# Patient Record
Sex: FEMALE | Race: WHITE | NOT HISPANIC OR LATINO | URBAN - METROPOLITAN AREA
[De-identification: names, ages, dates, MRNs, and addresses within clinical notes are randomized per-mention and may not be internally consistent; named-entity substitution may affect disease eponyms.]

---

## 2018-10-04 ENCOUNTER — OFFICE VISIT (OUTPATIENT)
Dept: OBSTETRICS AND GYNECOLOGY | Facility: CLINIC | Age: 28
End: 2018-10-04
Payer: COMMERCIAL

## 2018-10-04 VITALS
DIASTOLIC BLOOD PRESSURE: 70 MMHG | WEIGHT: 174.4 LBS | SYSTOLIC BLOOD PRESSURE: 110 MMHG | BODY MASS INDEX: 26.43 KG/M2 | HEIGHT: 68 IN

## 2018-10-04 DIAGNOSIS — Z01.419 WELL WOMAN EXAM WITH ROUTINE GYNECOLOGICAL EXAM: Primary | ICD-10-CM

## 2018-10-04 PROCEDURE — 99385 PREV VISIT NEW AGE 18-39: CPT | Performed by: OBSTETRICS & GYNECOLOGY

## 2018-10-04 NOTE — PROGRESS NOTES
10/4/2018  Kenyatta Carey is a 28 y.o. female who presents for annual exam.    Interested in getting IUD - thinking Merle.    Just went through breakup.        OB History:   Obstetric History       T0      L0     SAB0   TAB0   Ectopic0   Multiple0   Live Births0           Gyn History:    Patient's last menstrual period was 2018 (exact date).  Menses monthly- just went off the pill a month ago.  Switched brands throughout the year- mood swings, weight gain 10lb.    No hx of fibroids/ cysts  No hx of STIs  + Abn pap- s/p colposcopy which was normal, had another abnormal LSIL and followed by another colposcopy  Not currently sexually active, just went through a breakup- no hx of pain/ bleeding  Contraception- OCPs for contraception    Medical History:   Past Medical History:   Diagnosis Date   • Asthma        Surgical History:   Past Surgical History:   Procedure Laterality Date   • TONSILLECTOMY & ADENOIDECTOMY     • WISDOM TOOTH EXTRACTION         Allergies: Codeine and Shellfish derived    Prior to Admission medications    Not on File        Social History: live alone    + exercise (weights/ cardio)  Social History     Social History   • Marital status: Single     Spouse name: N/A   • Number of children: N/A   • Years of education: N/A     Social History Main Topics   • Smoking status: Never Smoker   • Smokeless tobacco: None   • Alcohol use Yes      Comment: social    • Drug use: No   • Sexual activity: Not Asked     Other Topics Concern   • None     Social History Narrative   • None        Family History:   Family History   Problem Relation Age of Onset   • Hypertension Father    • Hyperlipidemia Father    • Thyroid disease Mother    • Breast cancer Neg Hx    • Colon cancer Neg Hx    • Ovarian cancer Neg Hx        Review of Systems and Physical Exam  The following have been reviewed and updated as appropriate in this visit: Allergies  Meds  Fam Hx       /70   Ht 1.715 m (5'  "7.5\")   Wt 79.1 kg (174 lb 6.4 oz)   LMP 09/18/2018 (Exact Date)   BMI 26.91 kg/m²   HPI  Review of Systems  Physical Exam   Constitutional: She is oriented to person, place, and time. She appears well-developed and well-nourished.   Genitourinary: Vagina normal and uterus normal.   External female genitalia normal.   Urethral meatus normal.   Urethra normal.   Normal bladder.   Vagina normal.   Cervix exam normal.  Uterus is normal. Uterine contour is regular.   Adnexa normal.   Neck: Normal range of motion. Neck supple. No thyromegaly present.   Cardiovascular: Normal rate, regular rhythm and normal heart sounds.    Pulmonary/Chest: Effort normal and breath sounds normal. No respiratory distress. She has no wheezes. She has no rales. Right breast exhibits no inverted nipple, no mass, no nipple discharge, no skin change and no tenderness. Left breast exhibits no inverted nipple, no mass, no nipple discharge, no skin change and no tenderness.   Abdominal: Soft. She exhibits no distension and no mass. There is no tenderness. There is no rebound.   Neurological: She is alert and oriented to person, place, and time.   Psychiatric: She has a normal mood and affect. Her behavior is normal. Thought content normal.       Diagnoses and all orders for this visit:    Well woman exam with routine gynecological exam  -     PAP W/REFLEX HR HPV & CT/NG/TV  Normal exam  Discussed IUD options- leaning toward hormonal option.  Will hold off for now since just out of a relationship and wants to not use any hormones at the moment.     Ivon Tavera MD        "

## 2018-10-09 LAB
C TRACH RRNA SPEC QL NAA+PROBE: NOT DETECTED
CLINICAL INFO: NORMAL
CYTO CVX: NORMAL
CYTOLOGIST CVX/VAG CYTO: NORMAL
CYTOLOGIST CVX/VAG CYTO: NORMAL
CYTOLOGY CMNT CVX/VAG CYTO-IMP: NORMAL
DATE OF PREVIOUS PAP: NORMAL
DATE PREVIOUS BX: NO
LMP START DATE: NORMAL
N GONORRHOEA RRNA SPEC QL NAA+PROBE: NOT DETECTED
QST (ALWAYS MESSAGE): NORMAL
QUEST COMMENT (PAP): NORMAL
SPECIMEN SOURCE CVX/VAG CYTO: NORMAL
STAT OF ADQ CVX/VAG CYTO-IMP: NORMAL
T VAGINALIS RRNA SPEC QL NAA+PROBE: NOT DETECTED

## 2018-12-13 ENCOUNTER — PROCEDURE VISIT (OUTPATIENT)
Dept: OBSTETRICS AND GYNECOLOGY | Facility: CLINIC | Age: 28
End: 2018-12-13
Payer: COMMERCIAL

## 2018-12-13 VITALS — BODY MASS INDEX: 26.99 KG/M2 | WEIGHT: 174.9 LBS | DIASTOLIC BLOOD PRESSURE: 90 MMHG | SYSTOLIC BLOOD PRESSURE: 122 MMHG

## 2018-12-13 DIAGNOSIS — Z30.430 ENCOUNTER FOR INSERTION OF INTRAUTERINE CONTRACEPTIVE DEVICE (IUD): Primary | ICD-10-CM

## 2018-12-13 LAB — PREGNANCY TEST URINE, POC: NEGATIVE

## 2018-12-13 PROCEDURE — 81025 URINE PREGNANCY TEST: CPT | Performed by: OBSTETRICS & GYNECOLOGY

## 2018-12-13 PROCEDURE — 58300 INSERT INTRAUTERINE DEVICE: CPT | Performed by: OBSTETRICS & GYNECOLOGY

## 2018-12-13 NOTE — PROCEDURES
IUD Insertion Ambulatory  Date/Time: 12/13/2018 11:18 AM  Performed by: KAROL HUYNH  Authorized by: KAROL HUYNH     Consent:     Consent obtained:  Verbal and written    Consent given by:  Patient    Procedure risks and benefits discussed: yes      Patient questions answered: yes      Patient agrees, verbalizes understanding, and wants to proceed: yes    Procedure:     Pelvic exam performed: yes      Negative GC/chlamydia test: yes      Negative urine pregnancy test: yes      Cervix cleaned and prepped: yes      Speculum placed in vagina: yes      Tenaculum applied to cervix: yes      Uterus sound depth (cm):  7    IUD inserted with no complications: yes      IUD type:  Merle    Strings trimmed: yes    Post-procedure:     Patient will follow up after next period: yes    Comments:      LMP 12/12/18

## 2019-01-14 ENCOUNTER — OFFICE VISIT (OUTPATIENT)
Dept: OBSTETRICS AND GYNECOLOGY | Facility: CLINIC | Age: 29
End: 2019-01-14
Payer: COMMERCIAL

## 2019-01-14 VITALS — WEIGHT: 170.4 LBS | BODY MASS INDEX: 26.29 KG/M2 | SYSTOLIC BLOOD PRESSURE: 100 MMHG | DIASTOLIC BLOOD PRESSURE: 68 MMHG

## 2019-01-14 DIAGNOSIS — Z30.431 ENCOUNTER FOR ROUTINE CHECKING OF INTRAUTERINE CONTRACEPTIVE DEVICE (IUD): Primary | ICD-10-CM

## 2019-01-14 PROCEDURE — 99213 OFFICE O/P EST LOW 20 MIN: CPT | Mod: 24 | Performed by: OBSTETRICS & GYNECOLOGY

## 2019-01-14 NOTE — PROGRESS NOTES
Patient ID: Kenyatta Carey   : 1990  MRN: [id number]   Visit Date: 2019    Subjective   Kenyatta Carey is presenting today for IUD Check  S/P IUD insertion- Lacie on 18  Felt strings   Scant spotting.      Vital Signs for this encounter: /68   Wt 77.3 kg (170 lb 6.4 oz)   BMI 26.29 kg/m²     Obstetric History:   OB History    Para Term  AB Living   0 0 0 0 0 0   SAB TAB Ectopic Multiple Live Births   0 0 0 0 0           Past Medical History:  has a past medical history of Asthma.  Past Surgical History:  has a past surgical history that includes tonsillectomy & adenoidectomy and [place] tooth extraction.  Family History: family history includes Hyperlipidemia in her father; Hypertension in her father; Thyroid disease in her mother.  Social History:  reports that she has never smoked. She does not have any smokeless tobacco history on file. She reports that she drinks alcohol. She reports that she does not use drugs.  Medications:   Current Outpatient Prescriptions:   •  levonorgestrel (LACIE) 14 mcg/24 hour (3 years) intrauterine device intrauterine device, 1 each by intrauterine route once., Disp: , Rfl:     Allergies: is allergic to codeine and shellfish derived.     HPI  Review of Systems  Physical Exam   Constitutional: She appears well-developed and well-nourished.   Genitourinary: Vagina normal.   External female genitalia normal.   Urethral meatus normal.   Urethra normal.   Normal bladder.   Vagina normal.   Cervix exam normal.  Cervix exhibits visible IUD strings.   HENT:   Head: Normocephalic and atraumatic.   Pulmonary/Chest: Effort normal.     Problem List Items Addressed This Visit     None      Visit Diagnoses     Encounter for routine checking of intrauterine contraceptive device (IUD)    -  Primary      Strings visualized, no issues with IUD.  Will return in October for annual or PRN.     Ivon Tavera MD

## 2019-03-29 ENCOUNTER — APPOINTMENT (RX ONLY)
Dept: URBAN - METROPOLITAN AREA CLINIC 26 | Facility: CLINIC | Age: 29
Setting detail: DERMATOLOGY
End: 2019-03-29

## 2019-03-29 DIAGNOSIS — Z80.8 FAMILY HISTORY OF MALIGNANT NEOPLASM OF OTHER ORGANS OR SYSTEMS: ICD-10-CM

## 2019-03-29 DIAGNOSIS — L57.8 OTHER SKIN CHANGES DUE TO CHRONIC EXPOSURE TO NONIONIZING RADIATION: ICD-10-CM

## 2019-03-29 DIAGNOSIS — D22 MELANOCYTIC NEVI: ICD-10-CM

## 2019-03-29 PROBLEM — L20.84 INTRINSIC (ALLERGIC) ECZEMA: Status: ACTIVE | Noted: 2019-03-29

## 2019-03-29 PROBLEM — L85.3 XEROSIS CUTIS: Status: ACTIVE | Noted: 2019-03-29

## 2019-03-29 PROBLEM — D48.5 NEOPLASM OF UNCERTAIN BEHAVIOR OF SKIN: Status: ACTIVE | Noted: 2019-03-29

## 2019-03-29 PROBLEM — D22.9 MELANOCYTIC NEVI, UNSPECIFIED: Status: ACTIVE | Noted: 2019-03-29

## 2019-03-29 PROBLEM — J45.909 UNSPECIFIED ASTHMA, UNCOMPLICATED: Status: ACTIVE | Noted: 2019-03-29

## 2019-03-29 PROCEDURE — ? COUNSELING

## 2019-03-29 PROCEDURE — ? INVENTORY

## 2019-03-29 PROCEDURE — ? PATIENT SPECIFIC COUNSELING

## 2019-03-29 PROCEDURE — 99214 OFFICE O/P EST MOD 30 MIN: CPT

## 2019-05-07 ENCOUNTER — TELEPHONE (OUTPATIENT)
Dept: OBSTETRICS AND GYNECOLOGY | Facility: CLINIC | Age: 29
End: 2019-05-07

## 2019-05-07 NOTE — TELEPHONE ENCOUNTER
Spoke w pt, who has Merle since 12/2018, now w dull crampy pain for 1 week.  Cycles very light, spotting. Pt didn't take any meds.  I recommended she take OTC NSAIDs. No fever.  Pt reassured If cramps dont resolve, to schedule an appt

## 2019-10-22 ENCOUNTER — OFFICE VISIT (OUTPATIENT)
Dept: OBSTETRICS AND GYNECOLOGY | Facility: CLINIC | Age: 29
End: 2019-10-22
Payer: COMMERCIAL

## 2019-10-22 VITALS
SYSTOLIC BLOOD PRESSURE: 120 MMHG | HEIGHT: 67 IN | WEIGHT: 171.6 LBS | BODY MASS INDEX: 26.93 KG/M2 | DIASTOLIC BLOOD PRESSURE: 70 MMHG

## 2019-10-22 DIAGNOSIS — Z11.3 SCREEN FOR STD (SEXUALLY TRANSMITTED DISEASE): ICD-10-CM

## 2019-10-22 DIAGNOSIS — Z01.419 WOMEN'S ANNUAL ROUTINE GYNECOLOGICAL EXAMINATION: Primary | ICD-10-CM

## 2019-10-22 PROCEDURE — 99395 PREV VISIT EST AGE 18-39: CPT | Performed by: OBSTETRICS & GYNECOLOGY

## 2019-10-22 NOTE — PROGRESS NOTES
"10/22/2019  Kenyatta Carey is a 29 y.o. female who presents for annual exam.       No LMP recorded (lmp unknown). amenorrhea with yesenia  STI prevention: none  BCM: Yesenia since 12/13/18  Gardasil: completed all 3       PMHx: asthma  PSHx: tonsills/adenoids, wisdom teeth  POBHx: Nullip  PGYNHx: h/o abnl pap (LGSIL), colpo nl. No STIs. No ovarian cysts or fibroids  FamHx: M-a. hypothyroid  F-a. HTN, HLD    No h/o Br, ov, col, endo or panc CA  Social:   T/E/D: no tobacco or drugs, EtOH 0-2 drinks per week   Marital: single, sexually active 1 partner x 2 months, prefers men, no probs with sex  Work: [employer]  Exercise: weights and cardio at least 4 days a week       Pap: 10/4/18 WNL  Mammo: never  Colonoscopy: never      Review of Systems and Physical Exam  The following have been reviewed and updated as appropriate in this visit: Allergies  Meds  Problems       Visit Vitals  /70   Ht 1.702 m (5' 7\")   Wt 77.8 kg (171 lb 9.6 oz)   LMP  (LMP Unknown)   BMI 26.88 kg/m²     HPI  Review of Systems  Physical Exam   Constitutional: She is oriented to person, place, and time. She appears well-developed and well-nourished.   Genitourinary: Rectum normal, vagina normal and uterus normal. Pelvic exam was performed with patient in lithotomy exam position.     External female genitalia normal.   Urethral meatus normal.   Urethra normal.   Normal bladder. No bladder tenderness.   Vagina normal. Vagina exhibits no lesion. No erythema in the vagina. No vaginal discharge found. There is no uterine prolapse present.   Cervix exam normal.  Cervix exhibits visible IUD strings. Cervix does not exhibit motion tenderness or discharge.   Uterus is normal. Uterus is not tender. Uterine contour is regular. Uterus is anteverted.   Adnexa normal. Right adnexum does not display mass and does not display tenderness. Left adnexum does not display mass and does not display tenderness. Rectovaginal exam normal. Rectal exam " normal.   Rectal exam shows no external hemorrhoid.   Neck: Normal range of motion. No thyromegaly present.   Cardiovascular: Normal rate and regular rhythm.   Pulmonary/Chest: Effort normal and breath sounds normal. Right breast exhibits no mass, no nipple discharge and no skin change. Left breast exhibits no mass, no nipple discharge and no skin change.   Abdominal: Soft. Bowel sounds are normal. She exhibits no distension and no mass. There is no tenderness. There is no rebound and no guarding. No hernia.   Musculoskeletal: Normal range of motion. She exhibits no edema.   Neurological: She is alert and oriented to person, place, and time.   Skin: Skin is warm and dry.   Psychiatric: She has a normal mood and affect. Her behavior is normal.       29 y.o. for GYN annual  - Pap with reflex HPV up to date  - STI screening: GC/CT collected, lab slip for HIV, RPR, Hep B provided.   - BCM: Merle until 2021  - Gardasil: complete  - Annual due 1 year  Return in about 1 year (around 10/22/2020) for Annual exam.  Charo Arellano MD

## 2019-10-25 LAB
C TRACH RRNA SPEC QL NAA+PROBE: NEGATIVE
N GONORRHOEA RRNA SPEC QL NAA+PROBE: NEGATIVE

## 2020-06-05 ENCOUNTER — OFFICE VISIT (OUTPATIENT)
Dept: FAMILY MEDICINE | Facility: CLINIC | Age: 30
End: 2020-06-05
Payer: COMMERCIAL

## 2020-06-05 VITALS
WEIGHT: 173 LBS | TEMPERATURE: 99.5 F | SYSTOLIC BLOOD PRESSURE: 130 MMHG | HEIGHT: 67 IN | OXYGEN SATURATION: 98 % | HEART RATE: 69 BPM | DIASTOLIC BLOOD PRESSURE: 78 MMHG | BODY MASS INDEX: 27.15 KG/M2

## 2020-06-05 DIAGNOSIS — Z11.3 ROUTINE SCREENING FOR STI (SEXUALLY TRANSMITTED INFECTION): ICD-10-CM

## 2020-06-05 DIAGNOSIS — Z00.00 PREVENTATIVE HEALTH CARE: Primary | ICD-10-CM

## 2020-06-05 DIAGNOSIS — M25.472 LEFT ANKLE SWELLING: ICD-10-CM

## 2020-06-05 DIAGNOSIS — F41.9 ANXIETY: ICD-10-CM

## 2020-06-05 PROBLEM — J45.20 MILD INTERMITTENT ASTHMA WITHOUT COMPLICATION: Status: ACTIVE | Noted: 2020-06-05

## 2020-06-05 PROCEDURE — 99395 PREV VISIT EST AGE 18-39: CPT | Performed by: FAMILY MEDICINE

## 2020-06-05 ASSESSMENT — ENCOUNTER SYMPTOMS
HEADACHES: 0
RHINORRHEA: 0
SORE THROAT: 0
ABDOMINAL PAIN: 0
CONSTIPATION: 0
FREQUENCY: 0
CHILLS: 0
FEVER: 0
ENDOCRINE COMMENTS: +HAIR LOSS
SHORTNESS OF BREATH: 0
COUGH: 0
DYSPHORIC MOOD: 0
FATIGUE: 0
DIARRHEA: 0
NERVOUS/ANXIOUS: 1
ARTHRALGIAS: 0
DYSURIA: 0

## 2020-06-05 NOTE — PROGRESS NOTES
[facility]  [address] [place] [address]  [place] [address]  [phone]       Reason for visit:   Chief Complaint   Patient presents with   • Annual Exam      HPI   Kenyatta Carey is a 29 y.o. female who presents for a routine physical.   Diet -  Fruits and vegetables regularly  Exercise -  Weights, low impact cardio; wants to run but ankle swelling gets in the way  Stress- getting better  Sleep- 7-8 hours    Lives alone  Work- [employer]-   Pap Smear Due No    Pregnancy Intention: not currently  Contraception:yesenia    HPV Due  No   TDAP Due No  (8/2019)  Flu Due No   Lipids Due No   STDs Due No    Past Medical History:   Diagnosis Date   • Asthma       Patient Active Problem List    Diagnosis Date Noted   • Mild intermittent asthma without complication 06/05/2020   • Anxiety 06/05/2020   • Preventative health care 06/05/2020     Past Surgical History:   Procedure Laterality Date   • TONSILLECTOMY & ADENOIDECTOMY     • WISDOM TOOTH EXTRACTION       Social History     Socioeconomic History   • Marital status: Single     Spouse name: Not on file   • Number of children: 0   • Years of education: Not on file   • Highest education level: Not on file   Occupational History   • Occupation: Finance for [employer]   Social Needs   • Financial resource strain: Not on file   • Food insecurity:     Worry: Not on file     Inability: Not on file   • Transportation needs:     Medical: Not on file     Non-medical: Not on file   Tobacco Use   • Smoking status: Never Smoker   • Smokeless tobacco: Never Used   Substance and Sexual Activity   • Alcohol use: Yes     Alcohol/week: 0.0 - 2.0 standard drinks     Comment: social    • Drug use: Yes     Types: Marijuana     Comment: 1x week, medical marijuana for anxiety   • Sexual activity: Yes     Partners: Male     Birth control/protection: IUD   Lifestyle   • Physical activity:     Days per week: Not on file     Minutes per  session: Not on file   • Stress: Not on file   Relationships   • Social connections:     Talks on phone: Not on file     Gets together: Not on file     Attends Jehovah's witness service: Not on file     Active member of club or organization: Not on file     Attends meetings of clubs or organizations: Not on file     Relationship status: Not on file   • Intimate partner violence:     Fear of current or ex partner: Not on file     Emotionally abused: Not on file     Physically abused: Not on file     Forced sexual activity: Not on file   Other Topics Concern   • Not on file   Social History Narrative   • Not on file     Family History   Problem Relation Age of Onset   • Hypertension Biological Father    • Hyperlipidemia Biological Father    • Thyroid disease Biological Mother    • Breast cancer Other         maternal great aunt   • Colon cancer Neg Hx    • Ovarian cancer Neg Hx    • Endometrial cancer Neg Hx    • Pancreatic cancer Neg Hx      Codeine and Shellfish derived  Current Outpatient Medications   Medication Sig Dispense Refill   • levonorgestrel (LACIE) 14 mcg/24 hour (3 years) intrauterine device intrauterine device 1 each by intrauterine route once.       No current facility-administered medications for this visit.        Review of Systems   Constitutional: Negative for chills, fatigue and fever.   HENT: Negative for rhinorrhea and sore throat.    Respiratory: Negative for cough and shortness of breath.    Cardiovascular: Negative for chest pain.   Gastrointestinal: Negative for abdominal pain, constipation and diarrhea.   Endocrine:        +hair loss   Genitourinary: Negative for dysuria and frequency.        Denies breast pain or lumps   Musculoskeletal: Negative for arthralgias.   Neurological: Negative for headaches.   Psychiatric/Behavioral: Negative for dysphoric mood. The patient is nervous/anxious.      Objective   Vitals:    06/05/20 1306 06/05/20 2102   BP: (!) 125/105 130/78   BP Location: Left upper arm  "   Patient Position: Sitting    Pulse: 69    Temp: 37.5 °C (99.5 °F)    TempSrc: Temporal    SpO2: 98%    Weight: 78.5 kg (173 lb)    Height: 1.702 m (5' 7\")        Physical Exam   Constitutional: She appears well-developed and well-nourished. No distress.   HENT:   Right Ear: Tympanic membrane normal.   Left Ear: Tympanic membrane normal.   Nose: Nose normal. No mucosal edema or rhinorrhea.   Mouth/Throat: Oropharynx is clear and moist.   Eyes: Pupils are equal, round, and reactive to light.   Neck: No thyromegaly present.   Cardiovascular: Normal rate, regular rhythm, S1 normal and S2 normal.   Pulmonary/Chest: Breath sounds normal. No respiratory distress.   Abdominal: Bowel sounds are normal. There is no hepatomegaly. There is no tenderness.   Musculoskeletal: She exhibits no edema.   Psychiatric: Her behavior is normal. Her mood appears not anxious. She does not exhibit a depressed mood.       Procedures    No results found for: WBC, HGB, HCT, PLT, CHOL, TRIG, HDL, LDLDIRECT, ALT, AST, NA, K, CL, CREATININE, BUN, CO2, TSH, PSA, INR, HGBA1C, MICROALBUR      Assessment   Problem List Items Addressed This Visit        Other    Anxiety    Relevant Orders    TSH w reflex FT4    Preventative health care - Primary     BMI 27  Discussed Healthy diet/regular exercise.  Discussed healthy sleep and stress reduction.  Discussed contraception and pre-pregnancy health.  Pap UTD  Vaccines UTD  Check labs as ordered.  F/U 1 year or sooner if needed.           Relevant Orders    CBC and Differential    Comprehensive metabolic panel    Lipid panel      Other Visit Diagnoses     Routine screening for STI (sexually transmitted infection)        Relevant Orders    HIV 1,2 AB P24 AG    RPR    Left ankle swelling        Mild, w/ increased activity, s/p injury ~ 6 months ago; no further pain. Normal PE.  GIven reaassurance. F/U prn.              Rebeca Street MD  6/5/2020                               "

## 2020-06-05 NOTE — PATIENT INSTRUCTIONS
Thank you for coming in for your physical!    Keep up the good work with healthy diet and exercise.   Make sure to get 7-8 hours of sleep a night, and find healthy outlets for stress.   Please complete your labs if ordered.   Follow-up in one year or sooner if needed.      Thank you,     Dr. Street

## 2020-07-04 LAB
ALBUMIN SERPL-MCNC: 5 G/DL (ref 3.9–5)
ALBUMIN/GLOB SERPL: 2.3 {RATIO} (ref 1.2–2.2)
ALP SERPL-CCNC: 34 IU/L (ref 39–117)
ALT SERPL-CCNC: 12 IU/L (ref 0–32)
AST SERPL-CCNC: 18 IU/L (ref 0–40)
BASOPHILS # BLD AUTO: 0.1 X10E3/UL (ref 0–0.2)
BASOPHILS NFR BLD AUTO: 1 %
BILIRUB SERPL-MCNC: 0.2 MG/DL (ref 0–1.2)
BUN SERPL-MCNC: 11 MG/DL (ref 6–20)
BUN/CREAT SERPL: 13 (ref 9–23)
CALCIUM SERPL-MCNC: 9.9 MG/DL (ref 8.7–10.2)
CHLORIDE SERPL-SCNC: 105 MMOL/L (ref 96–106)
CHOLEST SERPL-MCNC: 208 MG/DL (ref 100–199)
CO2 SERPL-SCNC: 20 MMOL/L (ref 20–29)
CREAT SERPL-MCNC: 0.87 MG/DL (ref 0.57–1)
EOSINOPHIL # BLD AUTO: 0.4 X10E3/UL (ref 0–0.4)
EOSINOPHIL NFR BLD AUTO: 5 %
ERYTHROCYTE [DISTWIDTH] IN BLOOD BY AUTOMATED COUNT: 11.6 % (ref 11.7–15.4)
GLOBULIN SER CALC-MCNC: 2.2 G/DL (ref 1.5–4.5)
GLUCOSE SERPL-MCNC: 90 MG/DL (ref 65–99)
HCT VFR BLD AUTO: 40 % (ref 34–46.6)
HDLC SERPL-MCNC: 109 MG/DL
HGB BLD-MCNC: 13.2 G/DL (ref 11.1–15.9)
HIV 1+2 AB+HIV1 P24 AG SERPL QL IA: NON REACTIVE
IMM GRANULOCYTES # BLD AUTO: 0 X10E3/UL (ref 0–0.1)
IMM GRANULOCYTES NFR BLD AUTO: 0 %
LAB CORP EGFR IF AFRICN AM: 104 ML/MIN/1.73
LAB CORP EGFR IF NONAFRICN AM: 90 ML/MIN/1.73
LDLC SERPL CALC-MCNC: 91 MG/DL (ref 0–99)
LYMPHOCYTES # BLD AUTO: 2.3 X10E3/UL (ref 0.7–3.1)
LYMPHOCYTES NFR BLD AUTO: 31 %
MCH RBC QN AUTO: 30.2 PG (ref 26.6–33)
MCHC RBC AUTO-ENTMCNC: 33 G/DL (ref 31.5–35.7)
MCV RBC AUTO: 92 FL (ref 79–97)
MONOCYTES # BLD AUTO: 0.6 X10E3/UL (ref 0.1–0.9)
MONOCYTES NFR BLD AUTO: 8 %
NEUTROPHILS # BLD AUTO: 4 X10E3/UL (ref 1.4–7)
NEUTROPHILS NFR BLD AUTO: 55 %
PLATELET # BLD AUTO: 307 X10E3/UL (ref 150–450)
POTASSIUM SERPL-SCNC: 5 MMOL/L (ref 3.5–5.2)
PROT SERPL-MCNC: 7.2 G/DL (ref 6–8.5)
RBC # BLD AUTO: 4.37 X10E6/UL (ref 3.77–5.28)
RPR SER QL: NON REACTIVE
SODIUM SERPL-SCNC: 140 MMOL/L (ref 134–144)
T4 FREE SERPL-MCNC: 1.35 NG/DL (ref 0.82–1.77)
TRIGL SERPL-MCNC: 38 MG/DL (ref 0–149)
TSH SERPL DL<=0.005 MIU/L-ACNC: 2.48 UIU/ML (ref 0.45–4.5)
VLDLC SERPL CALC-MCNC: 8 MG/DL (ref 5–40)
WBC # BLD AUTO: 7.4 X10E3/UL (ref 3.4–10.8)

## 2020-07-07 NOTE — RESULT ENCOUNTER NOTE
Faraz You,    All of your lab look normal and your cholesterol looks good. Please let me know if you have any questions.    Thanks,   Dr. Street

## 2020-07-13 ENCOUNTER — TELEPHONE (OUTPATIENT)
Dept: FAMILY MEDICINE | Facility: CLINIC | Age: 30
End: 2020-07-13

## 2020-07-13 NOTE — TELEPHONE ENCOUNTER
Pt had her labs done from her physical and would like a call back in regards to her results.      Please call pt back when available.

## 2020-11-12 ENCOUNTER — TELEMEDICINE (OUTPATIENT)
Dept: FAMILY MEDICINE | Facility: CLINIC | Age: 30
End: 2020-11-12
Payer: COMMERCIAL

## 2020-11-12 DIAGNOSIS — R10.9 CHRONIC ABDOMINAL PAIN: Primary | ICD-10-CM

## 2020-11-12 DIAGNOSIS — G89.29 CHRONIC ABDOMINAL PAIN: Primary | ICD-10-CM

## 2020-11-12 PROCEDURE — 99213 OFFICE O/P EST LOW 20 MIN: CPT | Mod: 95 | Performed by: FAMILY MEDICINE

## 2020-11-12 ASSESSMENT — ENCOUNTER SYMPTOMS
NAUSEA: 1
DIARRHEA: 1
ABDOMINAL PAIN: 1

## 2020-11-12 NOTE — PROGRESS NOTES
"   Verification of Patient Location:  The patient affirms they are currently located in the following state: [place]    Request for Consent:    Audio Only Encounter   You and I are about to have a telemedicine check-in or visit. This is allowed because you have requested it. This telemedicine visit will be billed to your health insurance or you, if you are self-insured. You understand you will be responsible for any copayments or coinsurances that apply to your telemedicine visit. Before starting our telemedicine visit, I am required to get your consent for this virtual check-in or visit by telemedicine. Do you consent?    Patient Response to Request for Consent:  Yes      Visit Documentation:  Subjective     Patient ID: Kenyatta Carey is a 30 y.o. female.  1990    Has been having stomach issues.   Having episodes of post-prandial nausea and light-headedness.   Sometimes has a dull pain in her upper abdomen.   Worse over the past 2 weeks.   Has yesenia for contraception.   No burning in chest or sour taste in throat.   Occasionally takes pepto bismol.   Diarrhea for the last 2 weeks.  Tries the \"brat\" diet to fix it.   Saw GI by telemed over the summer.   Recommended RUQ US and celiac panel.   She has not done these.   Worried about cost of US.        The following have been reviewed and updated as appropriate in this visit:  Tobacco  Allergies  Meds  Problems  Med Hx  Surg Hx  Fam Hx  Soc Hx        Review of Systems   Cardiovascular: Negative for chest pain.   Gastrointestinal: Positive for abdominal pain, diarrhea and nausea.         Assessment/Plan   Diagnoses and all orders for this visit:    Chronic abdominal pain (Primary)    She will try avoiding typical GERD triggers and pepcid prn.   If no relief, she will proceed with celiac panel and RUQ US.    Time Spent in Medical Discussion During This Encounter:     9 minutes  "

## 2021-02-05 ENCOUNTER — OFFICE VISIT (OUTPATIENT)
Dept: OBSTETRICS AND GYNECOLOGY | Facility: CLINIC | Age: 31
End: 2021-02-05
Payer: COMMERCIAL

## 2021-02-05 VITALS
SYSTOLIC BLOOD PRESSURE: 120 MMHG | BODY MASS INDEX: 27.78 KG/M2 | HEIGHT: 67 IN | DIASTOLIC BLOOD PRESSURE: 80 MMHG | WEIGHT: 177 LBS

## 2021-02-05 DIAGNOSIS — Z11.3 SCREEN FOR STD (SEXUALLY TRANSMITTED DISEASE): ICD-10-CM

## 2021-02-05 DIAGNOSIS — Z01.419 WOMEN'S ANNUAL ROUTINE GYNECOLOGICAL EXAMINATION: Primary | ICD-10-CM

## 2021-02-05 PROCEDURE — 99395 PREV VISIT EST AGE 18-39: CPT | Performed by: OBSTETRICS & GYNECOLOGY

## 2021-02-05 NOTE — PROGRESS NOTES
"2/5/2021  Kenyatta Carey is a 30 y.o. female who presents for annual exam.      No LMP recorded (lmp unknown). Mostly amenorrhea with Merle. 1 day of spotting a few days a year  STI prevention: none  BCM: Merle since 12/13/18  Gardasil: completed all 3         PMHx: asthma  PSHx: tonsills/adenoids, wisdom teeth  POBHx: Nullip  PGYNHx: h/o abnl pap (LGSIL), colpo nl. No STIs. No ovarian cysts or fibroids  FamHx: M-a. hypothyroid  F-a. HTN, HLD    No h/o Br, ov, col, endo or panc CA  Social:   T/E/D: no tobacco or drugs, EtOH 0-5 drinks per week   Marital: single, sexually active 1 partner x 5 months, prefers men, no probs with sex  Work: [employer]  Exercise: weights and cardio at least 4 days a week         Pap: 10/4/18 WNL  Mammo: never  Colonoscopy: never    Review of Systems and Physical Exam  The following have been reviewed and updated as appropriate in this visit: Allergies  Meds  Problems       Visit Vitals  /80   Ht 1.702 m (5' 7\")   Wt 80.3 kg (177 lb)   LMP  (LMP Unknown)   BMI 27.72 kg/m²     HPI  Review of Systems    Physical Exam  Constitutional:       Appearance: She is well-developed.   Genitourinary:      Pelvic exam was performed with patient in the lithotomy position.      Urethra, vagina, cervix, uterus and rectum normal.      Bladder is not tender.      No lesions in the vagina.      No vaginal discharge or erythema.      Uterus is not tender.      Uterus is anteverted and regular.      No right adnexal mass present.   Rectum:      No external hemorrhoid.   Pelvic exam was performed with patient in lithotomy exam position.     External female genitalia normal.   Urethral meatus normal.   Urethra normal.   Normal bladder. No bladder tenderness.   Vagina normal. There is no uterine prolapse present.   Cervix exam normal.  Uterus is normal. Uterus is not tender. Uterine contour is regular. Uterus is anteverted.   Adnexa normal. Rectovaginal exam normal. Rectal " exam normal.   Rectal exam shows no external hemorrhoid. Neck:      Musculoskeletal: Normal range of motion.      Thyroid: No thyromegaly.   Cardiovascular:      Rate and Rhythm: Normal rate and regular rhythm.   Pulmonary:      Effort: Pulmonary effort is normal.      Breath sounds: Normal breath sounds.   Chest:      Breasts:         Right: No mass, nipple discharge or skin change.         Left: No mass, nipple discharge or skin change.   Abdominal:      General: Bowel sounds are normal. There is no distension.      Palpations: Abdomen is soft. There is no mass.      Tenderness: There is no abdominal tenderness. There is no guarding or rebound.      Hernia: No hernia is present.   Musculoskeletal: Normal range of motion.   Neurological:      Mental Status: She is alert and oriented to person, place, and time.   Skin:     General: Skin is warm and dry.   Psychiatric:         Behavior: Behavior normal.          29 y.o. for GYN annual  - Pap with reflex HPV up to date  - STI screening: GC/CT collected, lab slip for HIV, RPR, Hep B provided.   - BCM: Merle until 12/2021  - Gardasil: complete  - Annual due 1 year  Return in about 1 year (around 2/5/2022) for Annual exam.  Charo Arellano MD

## 2021-02-06 LAB
C TRACH RRNA SPEC QL NAA+PROBE: NEGATIVE
N GONORRHOEA RRNA SPEC QL NAA+PROBE: NEGATIVE

## 2021-11-02 ENCOUNTER — OFFICE VISIT (OUTPATIENT)
Dept: OBSTETRICS AND GYNECOLOGY | Facility: CLINIC | Age: 31
End: 2021-11-02
Payer: COMMERCIAL

## 2021-11-02 VITALS
BODY MASS INDEX: 29.98 KG/M2 | DIASTOLIC BLOOD PRESSURE: 68 MMHG | SYSTOLIC BLOOD PRESSURE: 120 MMHG | WEIGHT: 191 LBS | HEIGHT: 67 IN

## 2021-11-02 DIAGNOSIS — Z30.432 ENCOUNTER FOR IUD REMOVAL: Primary | ICD-10-CM

## 2021-11-02 PROCEDURE — 58301 REMOVE INTRAUTERINE DEVICE: CPT | Performed by: NURSE PRACTITIONER

## 2021-11-02 PROCEDURE — 99999 PR OFFICE/OUTPT VISIT,PROCEDURE ONLY: CPT | Performed by: NURSE PRACTITIONER

## 2021-11-02 NOTE — PROCEDURES
IUD Removal Ambulatory    Date/Time: 11/2/2021 6:13 PM  Performed by: Paula Gray CRNP  Authorized by: Paula Gray CRNP     Consent:     Consent obtained:  Verbal and written    Consent given by:  Patient    Procedure risks and benefits discussed: yes      Patient questions answered: yes      Patient agrees, verbalizes understanding, and wants to proceed: yes    Procedure:     Removed with no complications: yes      Estimated blood loss: no blood loss  Comments:      Taking vitamins   Considering pregnancy in approx 1 yr  Will use condoms   Plan B reviewed

## 2022-01-13 ENCOUNTER — TELEPHONE (OUTPATIENT)
Dept: OBSTETRICS AND GYNECOLOGY | Facility: CLINIC | Age: 32
End: 2022-01-13
Payer: COMMERCIAL

## 2022-01-13 NOTE — TELEPHONE ENCOUNTER
"Spoke with pt and is asking about a \"numbing medication\" for an IUD insertion with me next month-ParaGard. Advised 800 mg of ibuprofen for insertion and ensure that pt will be on menses. Offered Valium, pt defers. Did use Valium with last yesenia insertion in 2018, not helpful. Pt also however was not on menses.  "

## 2022-02-04 ENCOUNTER — OFFICE VISIT (OUTPATIENT)
Dept: OBSTETRICS AND GYNECOLOGY | Facility: CLINIC | Age: 32
End: 2022-02-04
Payer: COMMERCIAL

## 2022-02-04 VITALS
HEIGHT: 67 IN | SYSTOLIC BLOOD PRESSURE: 130 MMHG | WEIGHT: 186 LBS | DIASTOLIC BLOOD PRESSURE: 80 MMHG | BODY MASS INDEX: 29.19 KG/M2

## 2022-02-04 DIAGNOSIS — Z32.02 NEGATIVE PREGNANCY TEST: ICD-10-CM

## 2022-02-04 DIAGNOSIS — Z30.430 ENCOUNTER FOR IUD INSERTION: Primary | ICD-10-CM

## 2022-02-04 LAB
EXPIRATION DATE: NORMAL
Lab: NORMAL
POCT MANUFACTURER: NORMAL
PREGNANCY TEST URINE, POC: NEGATIVE

## 2022-02-04 PROCEDURE — 99999 PR OFFICE/OUTPT VISIT,PROCEDURE ONLY: CPT | Performed by: NURSE PRACTITIONER

## 2022-02-04 PROCEDURE — 81025 URINE PREGNANCY TEST: CPT | Performed by: NURSE PRACTITIONER

## 2022-02-04 PROCEDURE — 58300 INSERT INTRAUTERINE DEVICE: CPT | Performed by: NURSE PRACTITIONER

## 2022-02-04 RX ORDER — COPPER 313.4 MG/1
1 INTRAUTERINE DEVICE INTRAUTERINE ONCE
COMMUNITY

## 2022-02-04 NOTE — PROCEDURES
IUD Insertion Ambulatory    Date/Time: 2/4/2022 9:50 AM  Performed by: Ankita Castorena CRNP  Authorized by: Ankita Castorena CRNP     Consent:     Consent obtained:  Verbal and written    Consent given by:  Patient    Procedure risks and benefits discussed: yes      Patient questions answered: yes      Patient agrees, verbalizes understanding, and wants to proceed: yes    Procedure:     Pelvic exam performed: yes      Negative urine pregnancy test: yes      Negative serum pregnancy test: yes      Cervix cleaned and prepped: yes      Speculum placed in vagina: yes      Tenaculum applied to cervix: yes      Uterus sounded: yes      Uterus sound depth (cm):  7    IUD type:  ParaGard    Strings trimmed: yes    Post-procedure:     Patient tolerated procedure well: yes      Patient will follow up after next period: yes      Estimated blood loss: no blood loss  Comments:      LMP: 2/4/2022  Merle removed 11/2021  Mood swings on hormonal contraception   Understands ParaGard's menstrual profile  Urine HCG: negative  Pt tolerated procedure well  Given post procedure care instructions and when to call  No tampons x 6 weeks  No intercourse x 3 days  RTO: 6 weeks for string check as well as AV

## 2022-03-30 ASSESSMENT — ENCOUNTER SYMPTOMS
DYSURIA: 0
ACTIVITY CHANGE: 0
FATIGUE: 0
CHILLS: 0
DIFFICULTY URINATING: 0
DIAPHORESIS: 0
ABDOMINAL DISTENTION: 0
APPETITE CHANGE: 0
HEMATURIA: 0
FREQUENCY: 0
COLOR CHANGE: 0
FEVER: 0
UNEXPECTED WEIGHT CHANGE: 0
FLANK PAIN: 0

## 2022-03-30 NOTE — PROGRESS NOTES
"Subjective      Patient ID: Kenyatta Carey is a 31 y.o.     Vitals:    22 0848   BP: 118/80   Weight: 83.9 kg (185 lb)   Height: 1.715 m (5' 7.5\")     Body mass index is 28.55 kg/m².  Patient's last menstrual period was 2022 (exact date).    Merle removed and ParaGard inserted 22    Menses: regular and normal, very happy  4 days long, on heaviest day changes a pad every 3-4 hours, cramping: mild  Neg for IM spotting    Current partner x 12+ months  SA, neg for pain/bleeding    Occupation: Analytic : insurance     The following have been reviewed and updated as appropriate in this visit:    Current Outpatient Medications:   •  copper (PARAGARD T 380A) 380 square mm intrauterine device intrauterine device, 1 each by intrauterine route once., Disp: , Rfl:   Past Medical History:   Diagnosis Date   • Asthma      Past Surgical History:   Procedure Laterality Date   • TONSILLECTOMY & ADENOIDECTOMY     • WISDOM TOOTH EXTRACTION       OB History    Para Term  AB Living   0 0 0 0 0 0   SAB IAB Ectopic Multiple Live Births   0 0 0 0 0     Family History   Problem Relation Age of Onset   • Hypertension Biological Father    • Hyperlipidemia Biological Father    • Thyroid disease Biological Mother    • Breast cancer Other         maternal great aunt   • Colon cancer Neg Hx    • Ovarian cancer Neg Hx    • Endometrial cancer Neg Hx    • Pancreatic cancer Neg Hx      Review of Systems   Constitutional: Negative for activity change, appetite change, chills, diaphoresis, fatigue, fever and unexpected weight change.   Gastrointestinal: Negative for abdominal distention.   Endocrine: Negative for cold intolerance and heat intolerance.   Genitourinary: Negative for decreased urine volume, difficulty urinating, dysuria, enuresis, flank pain, frequency, genital sores, hematuria and urgency.   Skin: Negative for color change, pallor and rash.       Objective     Physical " Exam  Constitutional:       Appearance: She is well-developed.   Genitourinary:      Urethra, vagina, cervix, uterus and urethral meatus normal.      IUD strings visualized.     External female genitalia normal.   Urethral meatus normal.   Urethra normal.   Normal bladder.   Vagina normal.   Cervix exam normal.  Uterus is normal.   Adnexa normal. Rectovaginal exam normal. Neck:      Thyroid: No thyromegaly.   Chest:   Breasts: Breasts are symmetrical.      Right: No inverted nipple, mass, nipple discharge, skin change or tenderness.      Left: No inverted nipple, mass, nipple discharge, skin change or tenderness.       Abdominal:      General: There is no distension.      Palpations: Abdomen is soft.   Musculoskeletal:      Cervical back: Neck supple.   Lymphadenopathy:      Cervical: No cervical adenopathy.   Neurological:      Mental Status: She is alert and oriented to person, place, and time.   Skin:     General: Skin is warm.      Findings: No rash.   Vitals reviewed.         Assessment/Plan     NL GYN EXAM  Last pap: 10/2018: NIL  Pap with HPV/DNA sent out  Happy with ParaGard   RTO: AV/PRN

## 2022-03-31 ENCOUNTER — OFFICE VISIT (OUTPATIENT)
Dept: OBSTETRICS AND GYNECOLOGY | Facility: CLINIC | Age: 32
End: 2022-03-31
Payer: COMMERCIAL

## 2022-03-31 VITALS
BODY MASS INDEX: 28.04 KG/M2 | DIASTOLIC BLOOD PRESSURE: 80 MMHG | WEIGHT: 185 LBS | HEIGHT: 68 IN | SYSTOLIC BLOOD PRESSURE: 118 MMHG

## 2022-03-31 DIAGNOSIS — Z01.419 WELL WOMAN EXAM WITH ROUTINE GYNECOLOGICAL EXAM: Primary | ICD-10-CM

## 2022-03-31 DIAGNOSIS — Z11.3 SCREEN FOR STD (SEXUALLY TRANSMITTED DISEASE): ICD-10-CM

## 2022-03-31 PROCEDURE — 3008F BODY MASS INDEX DOCD: CPT | Performed by: NURSE PRACTITIONER

## 2022-03-31 PROCEDURE — 99395 PREV VISIT EST AGE 18-39: CPT | Performed by: NURSE PRACTITIONER

## 2022-04-05 LAB
C TRACH RRNA SPEC QL NAA+PROBE: NOT DETECTED
CLINICAL INFO: NORMAL
CYTO CVX: NORMAL
CYTOLOGIST CVX/VAG CYTO: NORMAL
CYTOLOGY CMNT CVX/VAG CYTO-IMP: NORMAL
DATE OF PREVIOUS PAP: NORMAL
DATE PREVIOUS BX: NORMAL
HPV E6+E7 MRNA CVX QL NAA+PROBE: NOT DETECTED
LMP START DATE: NORMAL
N GONORRHOEA RRNA SPEC QL NAA+PROBE: NOT DETECTED
QST (ALWAYS MESSAGE): NORMAL
QUEST COMMENT (PAP): NORMAL
SPECIMEN SOURCE CVX/VAG CYTO: NORMAL
STAT OF ADQ CVX/VAG CYTO-IMP: NORMAL

## 2022-07-25 ENCOUNTER — OFFICE VISIT (OUTPATIENT)
Dept: FAMILY MEDICINE | Facility: CLINIC | Age: 32
End: 2022-07-25
Payer: COMMERCIAL

## 2022-07-25 VITALS
TEMPERATURE: 97.4 F | SYSTOLIC BLOOD PRESSURE: 128 MMHG | DIASTOLIC BLOOD PRESSURE: 92 MMHG | HEART RATE: 72 BPM | WEIGHT: 191.8 LBS | BODY MASS INDEX: 29.07 KG/M2 | OXYGEN SATURATION: 98 % | HEIGHT: 68 IN

## 2022-07-25 DIAGNOSIS — F41.9 ANXIETY: Primary | ICD-10-CM

## 2022-07-25 DIAGNOSIS — J45.20 MILD INTERMITTENT ASTHMA WITHOUT COMPLICATION: ICD-10-CM

## 2022-07-25 PROCEDURE — 3008F BODY MASS INDEX DOCD: CPT

## 2022-07-25 PROCEDURE — 99213 OFFICE O/P EST LOW 20 MIN: CPT

## 2022-07-25 ASSESSMENT — PATIENT HEALTH QUESTIONNAIRE - PHQ9
SUM OF ALL RESPONSES TO PHQ QUESTIONS 1-9: 3
SUM OF ALL RESPONSES TO PHQ9 QUESTIONS 1 & 2: 1

## 2022-07-25 ASSESSMENT — ENCOUNTER SYMPTOMS
GASTROINTESTINAL NEGATIVE: 1
CONSTITUTIONAL NEGATIVE: 1
CARDIOVASCULAR NEGATIVE: 1
NEUROLOGICAL NEGATIVE: 1
RESPIRATORY NEGATIVE: 1
NERVOUS/ANXIOUS: 1

## 2022-07-25 NOTE — ASSESSMENT & PLAN NOTE
Increase in symptoms since April  SEBASTIAN 7 score 12, PHQ9 score 3  Pt interested in discussing medication, would like to consider at this time  Discussed SSRI, indication, use, and side effects  Instructed pt to follow up with me regarding starting medication  Continue therapy  Exercise, yoga, meditation  5 senses grounding technique discussed    Follow up for any worsening/concerning symptoms, otherwise 1 month for physical

## 2022-07-25 NOTE — PATIENT INSTRUCTIONS
Lexapro - let me know your thoughts on starting medication    5 Senses Grounding   5 things you can see  4 things you can feel  3 things you can hear  2 things you can smell  1 thing you can taste

## 2022-07-25 NOTE — PROGRESS NOTES
[facility]      Reason for visit:   Chief Complaint   Patient presents with   • jarvis Carey is a 31 y.o. female who presents for anxiety.     HPI  Pt has noted increase in anxiety symptoms since April. Pt states she had changes in work, broke up with boyfriend and moved out. Has started seeing a therapist and exercising. Last week had 2 panic attacks while working, has had hx of panic attacks in past. States last about 10-30 min, felt couldn't breath, not in control. She was able to calm herself down, called a friend.   Has never been on medication.          Past Medical History:   Diagnosis Date   • Asthma        Past Surgical History:   Procedure Laterality Date   • TONSILLECTOMY & ADENOIDECTOMY     • WISDOM TOOTH EXTRACTION         Social History     Tobacco Use   • Smoking status: Never Smoker   • Smokeless tobacco: Never Used   Substance Use Topics   • Alcohol use: Yes     Alcohol/week: 0.0 - 2.0 standard drinks     Comment: social    • Drug use: Yes     Types: Marijuana     Comment: 1x week, medical marijuana for anxiety       Family History   Problem Relation Age of Onset   • Hypertension Biological Father    • Hyperlipidemia Biological Father    • Thyroid disease Biological Mother    • Breast cancer Other         maternal great aunt   • Colon cancer Neg Hx    • Ovarian cancer Neg Hx    • Endometrial cancer Neg Hx    • Pancreatic cancer Neg Hx        Codeine and Shellfish derived    Current Outpatient Medications on File Prior to Visit   Medication Sig Dispense Refill   • copper (PARAGARD T 380A) 380 square mm intrauterine device intrauterine device 1 each by intrauterine route once.       No current facility-administered medications on file prior to visit.       Review of Systems   Constitutional: Negative.    Respiratory: Negative.    Cardiovascular: Negative.    Gastrointestinal: Negative.    Neurological: Negative.    Psychiatric/Behavioral: The patient is nervous/anxious.      PHQ 2  to 9:  Will the patient answer the depression questions?: Yes    Little interest or pleasure in doing things: Not at all    Feeling down, depressed, or hopeless: Several days    Depression Risk: 1    Trouble falling or staying asleep, or sleeping too much: Several days    Feeling tired or having little energy: Not at all    Poor appetite or overeating: Not at all    Feeling bad about yourself - or that you are a failure or have let yourself or your family down: Not at all    Trouble concentrating on things, such as reading the newspaper or watching television: Several days    Moving or speaking so slowly that other people could have noticed? Or the opposite - being so fidgety or restless that you have been moving around a lot more than usual.: Not at all    Thoughts that you would be better off dead or hurting yourself in some way: Not at all    Depression Risk Score: 3    If You Checked Off Any Problems, How Difficult Have These Problems Made It For You to Do Your Work, Take Care of Things at Home, or Get Along with Other People?: somewhat difficult    PHQ interpretation: PHQ result indicates none-minimal depression          SEBASTIAN-7  Feeling nervous, anxious or on edge: 2-->More than half the days    Not being able to stop or control worryin-->Several days    Worrying too much about different things: 2-->More than half the days    Trouble relaxing: 3-->Nearly every day    Being so restless that it is hard to sit still: 2-->More than half the days    Becoming easily annoyed or irritable: 1-->Several days    Feeling afraid as if something awful might happen: 1-->Several days      GAD7 Total Score: : 12      If you checked off any problems, how difficult have these made it for you to do your work, take care of things at home, or get along with other people?: Very difficult        Objective   Vitals:    22 1645 22 1709   BP: (!) 137/97 (!) 128/92   BP Location: Left upper arm    Patient Position: Sitting   "  Pulse: 72    Temp: 36.3 °C (97.4 °F)    TempSrc: Temporal    SpO2: 98%    Weight: 87 kg (191 lb 12.8 oz)    Height: 1.715 m (5' 7.5\")      Body mass index is 29.6 kg/m².    Physical Exam  Constitutional:       General: She is not in acute distress.     Appearance: Normal appearance. She is not ill-appearing.   Neck:      Thyroid: No thyromegaly.   Cardiovascular:      Rate and Rhythm: Normal rate and regular rhythm.      Heart sounds: Normal heart sounds.   Pulmonary:      Effort: Pulmonary effort is normal. No respiratory distress.      Breath sounds: Normal breath sounds.   Lymphadenopathy:      Cervical: No cervical adenopathy.   Skin:     General: Skin is warm and dry.   Neurological:      Mental Status: She is alert and oriented to person, place, and time.   Psychiatric:         Mood and Affect: Mood normal.         Lab Results   Component Value Date    WBC 7.4 07/03/2020    HGB 13.2 07/03/2020    HCT 40.0 07/03/2020     07/03/2020    CHOL 208 (H) 07/03/2020    TRIG 38 07/03/2020     07/03/2020    ALT 12 07/03/2020    AST 18 07/03/2020     07/03/2020    K 5.0 07/03/2020     07/03/2020    CREATININE 0.87 07/03/2020    BUN 11 07/03/2020    CO2 20 07/03/2020    TSH 2.480 07/03/2020           Assessment   Problem List Items Addressed This Visit        Respiratory    Mild intermittent asthma without complication     chronic, noted in hx              Other    Anxiety - Primary     Increase in symptoms since April  SEBASTIAN 7 score 12, PHQ9 score 3  Pt interested in discussing medication, would like to consider at this time  Discussed SSRI, indication, use, and side effects  Instructed pt to follow up with me regarding starting medication  Continue therapy  Exercise, yoga, meditation  5 senses grounding technique discussed    Follow up for any worsening/concerning symptoms, otherwise 1 month for physical                     SOBEIDA Turk  7/25/2022     "

## 2025-07-25 ENCOUNTER — ANNUAL EXAM (OUTPATIENT)
Dept: OBGYN CLINIC | Facility: CLINIC | Age: 35
End: 2025-07-25
Payer: COMMERCIAL

## 2025-07-25 VITALS
SYSTOLIC BLOOD PRESSURE: 118 MMHG | WEIGHT: 193 LBS | BODY MASS INDEX: 29.25 KG/M2 | HEIGHT: 68 IN | DIASTOLIC BLOOD PRESSURE: 78 MMHG

## 2025-07-25 DIAGNOSIS — F41.9 ANXIETY: ICD-10-CM

## 2025-07-25 DIAGNOSIS — Z87.42 HISTORY OF ABNORMAL CERVICAL PAP SMEAR: ICD-10-CM

## 2025-07-25 DIAGNOSIS — Z12.4 CERVICAL CANCER SCREENING: ICD-10-CM

## 2025-07-25 DIAGNOSIS — Z01.419 ENCOUNTER FOR ANNUAL ROUTINE GYNECOLOGICAL EXAMINATION: Primary | ICD-10-CM

## 2025-07-25 PROCEDURE — 99385 PREV VISIT NEW AGE 18-39: CPT | Performed by: OBSTETRICS & GYNECOLOGY

## 2025-07-25 RX ORDER — SERTRALINE HYDROCHLORIDE 25 MG/1
TABLET, FILM COATED ORAL
COMMUNITY
Start: 2024-12-31 | End: 2025-07-25 | Stop reason: SDUPTHER

## 2025-07-25 RX ORDER — BUPROPION HYDROCHLORIDE 150 MG/1
TABLET ORAL
COMMUNITY
Start: 2025-02-01 | End: 2025-07-25 | Stop reason: SDUPTHER

## 2025-07-25 RX ORDER — COPPER 313.4 MG/1
1 INTRAUTERINE DEVICE INTRAUTERINE ONCE
COMMUNITY
End: 2025-07-25

## 2025-07-25 RX ORDER — SERTRALINE HYDROCHLORIDE 25 MG/1
25 TABLET, FILM COATED ORAL
Qty: 90 TABLET | Refills: 3 | Status: SHIPPED | OUTPATIENT
Start: 2025-07-25

## 2025-07-25 RX ORDER — BUPROPION HYDROCHLORIDE 150 MG/1
150 TABLET ORAL EVERY MORNING
Qty: 90 TABLET | Refills: 3 | Status: SHIPPED | OUTPATIENT
Start: 2025-07-25

## 2025-07-25 RX ORDER — PANTOPRAZOLE SODIUM 20 MG/1
20 TABLET, DELAYED RELEASE ORAL DAILY
COMMUNITY
End: 2025-07-25

## 2025-07-25 NOTE — ASSESSMENT & PLAN NOTE
Patient reports h/o anxiety and prior to 2024 delivery was on Wellbutrin XL 150mg.  She did well in pregnancy but states after delivery worsening symptoms and OB added Zoloft 25mg daily, which has helped.  She tried to wean off Zoloft once since then and symptoms returned so she has continued.  Asking if can be refilled today.  She also sees a therapist regularly.  Agreed to refill since she is doing well on medications.  Discussed I do not have much experience with multiple medication for anxiety so if symptoms worsening would likely refer her to PCP or mental health professional, but I am fine to refill now.    Orders:    buPROPion (WELLBUTRIN XL) 150 mg 24 hr tablet; Take 1 tablet (150 mg total) by mouth every morning    sertraline (ZOLOFT) 25 mg tablet; Take 1 tablet (25 mg total) by mouth daily at bedtime

## 2025-07-25 NOTE — PROGRESS NOTES
"Annual Wellness Visit  [facility] - [address], [address], [place] [address]    ASSESSMENT/PLAN: Bryanna De Souza is a 34 y.o.  who presents for annual gynecologic exam.    Assessment & Plan  Encounter for annual routine gynecological examination  - Routine well woman exam completed today.  - Cervical Cancer Screening: Current ASCCP Guidelines reviewed. Last Pap: per pt  normal.  Past abnormal pap: per pt  \"low grade pap\".  Next Pap Due: today.  - HPV Vaccination status: Immunization series complete  - STI screening offered including HIV testing: offered, pt declined  - Contraceptive counseling discussed.  Current contraception: vasectomy,   - The following were reviewed in today's visit: family planning choices, exercise, and healthy diet       Cervical cancer screening    Orders:    IGP, Aptima HPV, Rfx 16/18,45    Anxiety  Patient reports h/o anxiety and prior to  delivery was on Wellbutrin XL 150mg.  She did well in pregnancy but states after delivery worsening symptoms and OB added Zoloft 25mg daily, which has helped.  She tried to wean off Zoloft once since then and symptoms returned so she has continued.  Asking if can be refilled today.  She also sees a therapist regularly.  Agreed to refill since she is doing well on medications.  Discussed I do not have much experience with multiple medication for anxiety so if symptoms worsening would likely refer her to PCP or mental health professional, but I am fine to refill now.    Orders:    buPROPion (WELLBUTRIN XL) 150 mg 24 hr tablet; Take 1 tablet (150 mg total) by mouth every morning    sertraline (ZOLOFT) 25 mg tablet; Take 1 tablet (25 mg total) by mouth daily at bedtime    History of abnormal cervical Pap smear    Orders:    IGP, Aptima HPV, Rfx 16/18,45      Next visit: 1 year Wellness      CC:  Annual Gynecologic Examination    HPI: Bryanna De Souza is a 34 y.o.  who presents for annual gynecologic " "examination.  Patient here for annual Wellness.  Delivered baby in [place] 2024 and now relocated back to this area ([place] area [place]    Monthly periods.  No issues with periods.    Sexually active, no new partners.  Vasectomy.    Abnormal pap , LGSIL pap with HR HPV negative per patient.  No colposcopy done per pt request.  Patient has had abnormal and colpo in past and no worse than \"low grade\".  2024 negative per.    Gardasil complete.        Gyn History  Patient's last menstrual period was 2025 (exact date).     Last Pap:  per pt normal    She  reports being sexually active and has had partner(s) who are male. She reports using the following method of birth control/protection: Male Sterilization.       OB History      Past Medical History:  No date: Abnormal Pap smear of cervix      Comment:  multiple abnormal, last  per pt  No date: Anxiety      Comment:  was on Wellbutrin prior to pregnancy, symptoms worsened                postpartum and Zoloft added;  seeing therapist as well  No date: Childhood asthma     Past Surgical History:  2024:  SECTION  No date: TONSILLECTOMY AND ADENOIDECTOMY  2008: WISDOM TOOTH EXTRACTION     Family History[1]     Social History[2]     Current Medications[3]    She is allergic to shellfish-derived products - food allergy and codeine..    ROS negative except as noted in HPI    Objective:  /78 (BP Location: Left arm, Patient Position: Sitting, Cuff Size: Standard)   Ht 5' 7.5\" (1.715 m)   Wt 87.5 kg (193 lb)   LMP 2025 (Exact Date)   Breastfeeding No   BMI 29.78 kg/m²      Physical Exam  Constitutional:       Appearance: Normal appearance.   Chest:   Breasts:     Right: Normal. No mass or tenderness.      Left: Normal. No mass or tenderness.   Abdominal:      Palpations: Abdomen is soft.      Tenderness: There is no abdominal tenderness.   Genitourinary:     General: Normal vulva.      Vagina: No bleeding or " lesions.      Cervix: Friability (with cytobrush from pap) present.      Uterus: Normal. Not tender.       Adnexa:         Right: No mass or tenderness.          Left: No mass or tenderness.        Rectum: No external hemorrhoid.     Musculoskeletal:         General: Normal range of motion.   Lymphadenopathy:      Upper Body:      Right upper body: No axillary adenopathy.      Left upper body: No axillary adenopathy.     Neurological:      Mental Status: She is alert and oriented to person, place, and time.     Psychiatric:         Mood and Affect: Mood normal.         Behavior: Behavior normal.                [1]   Family History  Problem Relation Name Age of Onset    Hypothyroidism Mother      Hypertension Father      Hypertension Brother      Breast cancer Neg Hx      Colon cancer Neg Hx      Colon polyps Neg Hx     [2]   Social History  Tobacco Use    Smoking status: Never    Smokeless tobacco: Never   Substance Use Topics    Alcohol use: Not Currently    Drug use: Never   [3]   Current Outpatient Medications:     buPROPion (WELLBUTRIN XL) 150 mg 24 hr tablet, Take 1 tablet (150 mg total) by mouth every morning, Disp: 90 tablet, Rfl: 3    sertraline (ZOLOFT) 25 mg tablet, Take 1 tablet (25 mg total) by mouth daily at bedtime, Disp: 90 tablet, Rfl: 3

## 2025-07-29 LAB
CYTOLOGIST CVX/VAG CYTO: NORMAL
DX ICD CODE: NORMAL
HPV GENOTYPE REFLEX: NORMAL
HPV I/H RISK 4 DNA CVX QL PROBE+SIG AMP: NEGATIVE
OTHER STN SPEC: NORMAL
PATH REPORT.FINAL DX SPEC: NORMAL
SL AMB NOTE:: NORMAL
SL AMB SPECIMEN ADEQUACY: NORMAL
SL AMB TEST METHODOLOGY: NORMAL